# Patient Record
Sex: FEMALE | Race: WHITE | NOT HISPANIC OR LATINO | URBAN - NONMETROPOLITAN AREA
[De-identification: names, ages, dates, MRNs, and addresses within clinical notes are randomized per-mention and may not be internally consistent; named-entity substitution may affect disease eponyms.]

---

## 2020-02-07 ENCOUNTER — IMPORTED ENCOUNTER (OUTPATIENT)
Dept: URBAN - NONMETROPOLITAN AREA CLINIC 1 | Facility: CLINIC | Age: 60
End: 2020-02-07

## 2020-02-07 PROBLEM — H25.813: Noted: 2020-02-07

## 2020-02-07 PROCEDURE — 99204 OFFICE O/P NEW MOD 45 MIN: CPT

## 2020-02-07 NOTE — PATIENT DISCUSSION
Cataract OU-Visually significant cataract OU.-Cataract(s) causing symptomatic impairment of visual function not correctable with a tolerable change in glasses or contact lenses lighting or non-operative means resulting in specific activity limitations and/or participation restrictions including but not limited to reading viewing television driving or meeting vocational or recreational needs. -Expectation is clearer vision and functional improvement in symptoms as well as reduced glare disability after cataract removal.-Order IOLMaster and OPD today. -Recommend Standard/Traditional OU based on today's OPD testing and lifestyle questionnaire.-All questions were answered regarding surgery including pre and post-op medications appointments activity restrictions and anesthetic usage.-The risks benefits and alternatives and special risk factors for the patient were discussed in detail including but not limited to: bleeding infection retinal detachment vitreous loss problems with the implant and possible need for additional surgery.-Although rare the possibility of complete vision loss was discussed.-The possible need for glasses post-operatively was discussed.-Order medical clearance exam based on history of high cholesterol and hypertension.-Patient elects to proceed with cataract surgery OS. Will schedule at patient's convenience and re-evaluate OD in the future. ***Patient elects Standard/Traditional OU starting with OS**Recommend Complex/Trypan OU and Viscoat OU

## 2020-02-11 ENCOUNTER — IMPORTED ENCOUNTER (OUTPATIENT)
Dept: URBAN - NONMETROPOLITAN AREA CLINIC 1 | Facility: CLINIC | Age: 60
End: 2020-02-11

## 2020-02-11 PROBLEM — H25.813: Noted: 2020-02-11

## 2020-05-21 ENCOUNTER — IMPORTED ENCOUNTER (OUTPATIENT)
Dept: URBAN - NONMETROPOLITAN AREA CLINIC 1 | Facility: CLINIC | Age: 60
End: 2020-05-21

## 2020-05-21 PROCEDURE — 99213 OFFICE O/P EST LOW 20 MIN: CPT

## 2020-05-21 NOTE — PATIENT DISCUSSION
Cataract OU-Visually significant cataract OU.-Cataract(s) causing symptomatic impairment of visual function not correctable with a tolerable change in glasses or contact lenses lighting or non-operative means resulting in specific activity limitations and/or participation restrictions including but not limited to reading viewing television driving or meeting vocational or recreational needs. -Expectation is clearer vision and functional improvement in symptoms as well as reduced glare disability after cataract removal.-Recommend Standard/Traditional OU based on previous OPD testing and lifestyle questionnaire.-All questions were answered regarding surgery including pre and post-op medications appointments activity restrictions and anesthetic usage.-The risks benefits and alternatives and special risk factors for the patient were discussed in detail including but not limited to: bleeding infection retinal detachment vitreous loss problems with the implant and possible need for additional surgery.-Although rare the possibility of complete vision loss was discussed.-The possible need for glasses post-operatively was discussed.-Order medical clearance exam based on history of high cholesterol and hypertension.-Patient elects to proceed with cataract surgery OS. Will schedule at patient's convenience and re-evaluate OD in the future. ***Patient elects Standard/Traditional OU starting with OS**Recommend Complex/Trypan OU and Viscoat OU

## 2020-06-22 ENCOUNTER — IMPORTED ENCOUNTER (OUTPATIENT)
Dept: URBAN - NONMETROPOLITAN AREA CLINIC 1 | Facility: CLINIC | Age: 60
End: 2020-06-22

## 2020-06-22 PROBLEM — I10: Noted: 2020-06-22

## 2020-06-22 PROBLEM — Z01.818: Noted: 2020-06-22

## 2020-06-22 PROBLEM — D64.9: Noted: 2020-06-22

## 2020-06-22 PROBLEM — E78.5: Noted: 2020-06-22

## 2020-07-08 ENCOUNTER — IMPORTED ENCOUNTER (OUTPATIENT)
Dept: URBAN - NONMETROPOLITAN AREA CLINIC 1 | Facility: CLINIC | Age: 60
End: 2020-07-08

## 2020-07-08 PROBLEM — H25.811: Noted: 2020-07-08

## 2020-07-08 PROBLEM — Z98.42: Noted: 2020-07-08

## 2020-07-08 PROCEDURE — 99024 POSTOP FOLLOW-UP VISIT: CPT

## 2020-07-08 NOTE — PATIENT DISCUSSION
Cataract OD-Visually significant cataract OD. -Cataract causing symptomatic impairment of visual function not correctable with a tolerable change in glasses or contact lenses lighting or non-operative means resulting in specific activity limitations and/or participation restrictions including but not limited to reading viewing television driving or meeting vocational or recreational needs. -Expectation is clearer vision and functional improvement in symptoms as well as reduced glare disability after cataract removal.-Recommend standard/traditional based on previous OPD testing and lifestyle questionnaire.-All questions were answered regarding surgery including pre and post-op medications appointments activity restrictions and anesthetic usage.-The risks benefits and alternatives and special risk factors for the patient were discussed in detail including but not limited to: bleeding infection retinal detachment vitreous loss problems with the implant and possible need for additional surgery.-Although rare the possibility of complete vision loss was discussed.-The need for glasses post-operatively was discussed.-Patient elects to proceed with cataract surgery OD. Will schedule at patient's convenience. 1 week s/p PCIOL OS-Pt doing well at 1 week s/p PCIOL OS.-Continue post-op gtts according to instruction sheet.-Okay to resume usual activities and d/c eye shield.

## 2021-08-02 NOTE — PATIENT DISCUSSION
Patient has many drug allergies to medications and dilation. Please check thoroughly before administering.

## 2021-08-02 NOTE — PATIENT DISCUSSION
The patient was unable to be dilated today due to allergic reactions to dilation drops in the past at Dr. Rut Fink office. We will get confirmation from Dr. Netta Norris office of which drops we're able to use for surgery day.

## 2021-08-09 NOTE — PATIENT DISCUSSION
Patient resting comfortably in bed, VSS on RA, awakens easily to voice, updated on POC, given phone.     The patient was informed that with the Basic option, they will most likely need prescription glasses at all focal points after surgery. The patient elects Basic OS, goal of emmetropia.

## 2021-08-09 NOTE — PATIENT DISCUSSION
The patient was unable to be dilated today due to allergic reactions to dilation drops in the past at Dr. Rick Salinas office. We will get confirmation from Dr. Ford Blair office of which drops we're able to use for surgery day.

## 2021-08-09 NOTE — PATIENT DISCUSSION
The patient was unable to be dilated today due to allergic reactions to dilation drops in the past at Dr. Deandre Romero office. We will get confirmation from Dr. Karla Osuna office of which drops we're able to use for surgery day.

## 2021-08-09 NOTE — PATIENT DISCUSSION
The patient was unable to be dilated today due to allergic reactions to dilation drops in the past at Dr. Yodit Soni office. We will get confirmation from Dr. Elli Gallagher office of which drops we're able to use for surgery day.

## 2021-08-17 NOTE — PATIENT DISCUSSION
OK to proceed with IOL OD due to [address] for Sx OD made today with [facility] and goal is for Basic OD, goal of emmetropia.  pt ed may need gls at [facility] for personal BCVA.

## 2021-08-17 NOTE — PATIENT DISCUSSION
OK to proceed with IOL OD due to [address] for Sx OD made today with [facility] and goal is for Basic OD, goal of emmetropia.  pt ed may need gls at [facility] for personal BCVA.

## 2021-08-17 NOTE — PATIENT DISCUSSION
The patient was unable to be dilated today due to allergic reactions to dilation drops in the past at Dr. Hermilo Villegas office. We will get confirmation from Dr. Sajan Arriaga office of which drops we're able to use for surgery day.

## 2021-08-17 NOTE — PATIENT DISCUSSION
The patient was unable to be dilated today due to allergic reactions to dilation drops in the past at Dr. Carlo Sher office. We will get confirmation from Dr. Saravanan Marques office of which drops we're able to use for surgery day.

## 2021-08-17 NOTE — PATIENT DISCUSSION
OK to proceed with IOL OD due to [address] for Sx OD made today with [facility] and goal is for Basic OD, goal of emmetropia.  pt ed may need gls at [facility] for personal BCVA.

## 2021-08-17 NOTE — PATIENT DISCUSSION
The patient was unable to be dilated today due to allergic reactions to dilation drops in the past at Dr. Shimon Rob office. We will get confirmation from Dr. Saurabh Mijares office of which drops we're able to use for surgery day.

## 2022-04-09 ASSESSMENT — TONOMETRY
OD_IOP_MMHG: 15
OS_IOP_MMHG: 17
OD_IOP_MMHG: 16
OS_IOP_MMHG: 15
OS_IOP_MMHG: 16
OD_IOP_MMHG: 17

## 2022-04-09 ASSESSMENT — VISUAL ACUITY
OS_CC: 20/25
OD_CC: 20/400
OS_SC: 20/60+2
OD_CC: 20/300
OS_CC: 20/25
OD_PH: 20/50
OD_CC: 20/30
OD_GLARE: 20/400
OS_GLARE: 20/400
OS_CC: 20/200+1
OS_PH: 20/70
OD_PH: 20/50
OS_PH: 20/70
OD_PAM: 20/25
OD_CC: 20/30
OD_CC: 20/30
OD_CC: 20/200-1
OD_GLARE: 20/400
OD_CC: 20/200-1
OS_GLARE: 20/400
OS_CC: 20/200+1
OS_SC: 20/60+2
OD_PH: 20/50
OS_CC: 20/25
OS_GLARE: 20/400
OD_GLARE: 20/400
OD_PAM: 20/25
OS_SC: 20/60+2
OS_AM: 20/25
OS_CC: 20/20
OS_PH: 20/70
OS_AM: 20/25
OS_AM: 20/25
OD_PAM: 20/25
OD_PH: 20/50
